# Patient Record
Sex: MALE | URBAN - METROPOLITAN AREA
[De-identification: names, ages, dates, MRNs, and addresses within clinical notes are randomized per-mention and may not be internally consistent; named-entity substitution may affect disease eponyms.]

---

## 2017-08-23 ENCOUNTER — EMERGENCY (EMERGENCY)
Facility: HOSPITAL | Age: 27
LOS: 1 days | Discharge: HOME | End: 2017-08-23
Attending: EMERGENCY MEDICINE | Admitting: EMERGENCY MEDICINE
Payer: SELF-PAY

## 2017-08-23 VITALS
HEART RATE: 87 BPM | SYSTOLIC BLOOD PRESSURE: 158 MMHG | TEMPERATURE: 99 F | DIASTOLIC BLOOD PRESSURE: 101 MMHG | RESPIRATION RATE: 18 BRPM | OXYGEN SATURATION: 100 %
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
## 2017-08-23 DIAGNOSIS — S92.352A DISPLACED FRACTURE OF FIFTH METATARSAL BONE, LEFT FOOT, INITIAL ENCOUNTER FOR CLOSED FRACTURE: ICD-10-CM
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
## 2017-08-23 DIAGNOSIS — Z88.8 ALLERGY STATUS TO OTHER DRUGS, MEDICAMENTS AND BIOLOGICAL SUBSTANCES: ICD-10-CM

## 2017-08-23 DIAGNOSIS — Z79.899 OTHER LONG TERM (CURRENT) DRUG THERAPY: ICD-10-CM
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
## 2017-08-23 DIAGNOSIS — M79.672 PAIN IN LEFT FOOT: ICD-10-CM

## 2017-08-23 PROCEDURE — 73630 X-RAY EXAM OF FOOT: CPT | Mod: 26,LT

## 2017-08-23 PROCEDURE — 99284 EMERGENCY DEPT VISIT MOD MDM: CPT | Mod: 25

## 2017-08-23 PROCEDURE — 28470 CLTX METATARSAL FX WO MNP EA: CPT | Mod: 54

## 2017-08-23 PROCEDURE — 99283 EMERGENCY DEPT VISIT LOW MDM: CPT | Mod: 25

## 2017-08-23 PROCEDURE — 28470 CLTX METATARSAL FX WO MNP EA: CPT | Mod: LT

## 2017-08-23 PROCEDURE — 73630 X-RAY EXAM OF FOOT: CPT

## 2017-08-23 RX ORDER — OXYCODONE AND ACETAMINOPHEN 5; 325 MG/1; MG/1
1 TABLET ORAL EVERY 4 HOURS
Qty: 0 | Refills: 0 | Status: DISCONTINUED | OUTPATIENT
Start: 2017-08-23 | End: 2017-08-23

## 2017-08-23 RX ORDER — ESOMEPRAZOLE MAGNESIUM 40 MG/1
0 CAPSULE, DELAYED RELEASE ORAL
Qty: 0 | Refills: 0 | COMMUNITY

## 2017-08-23 RX ADMIN — OXYCODONE AND ACETAMINOPHEN 1 TABLET(S): 5; 325 TABLET ORAL at 19:47

## 2017-08-23 RX ADMIN — OXYCODONE AND ACETAMINOPHEN 1 TABLET(S): 5; 325 TABLET ORAL at 19:07
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
## 2017-08-23 NOTE — ED PROVIDER NOTE - MEDICAL DECISION MAKING DETAILS
Pt stable. Pain controlled. X-ray show left closed non-displaced 5th Metatarsal neck fracture. Wrapped with webrol and ace bandage. Pt instructed to take Motrin for pain. Pt instructed to wear surgical shoe at all times. f/u in Podiatry clinic on [address] [place] [address]. Pt safe for d/c.
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
## 2017-08-23 NOTE — ED PROVIDER NOTE - CARE PLAN
Principal Discharge DX:	Fracture Principal Discharge DX:	Closed fracture of foot, left, initial encounter

## 2017-08-23 NOTE — ED PROVIDER NOTE - PHYSICAL EXAMINATION
Left foot focused exam:   Vasc: DP 2/4 PT 2/4. CFT < 3 sec x10. pedal hair present  Derm: ecchymosis on the dorsal aspect of the midfoot. no plantar ecchymosis. no open lesions. no abrasions. no clinical signs of infection.   Neuro: grossly intact  NSK: muscle power 4/5 in all compartments. Pain on eversion of the foot. Left foot focused exam:   Vasc: DP 2/4 PT 2/4. CFT < 3 sec x10. pedal hair present  Derm: ecchymosis on the dorsal aspect of the midfoot. no plantar ecchymosis. no open lesions. no abrasions. no clinical signs of infection.   Neuro: grossly intact  MSK: muscle power 4/5 in all compartments. Pain on eversion of the foot. ankle ROM wnl. STJ ROM wnl. Midtarsal joint ROM wnl.

## 2019-04-03 ENCOUNTER — OUTPATIENT (OUTPATIENT)
Dept: OUTPATIENT SERVICES | Facility: HOSPITAL | Age: 29
LOS: 1 days | Discharge: HOME | End: 2019-04-03
Payer: COMMERCIAL

## 2019-06-21 DIAGNOSIS — F32.2 MAJOR DEPRESSIVE DISORDER, SINGLE EPISODE, SEVERE WITHOUT PSYCHOTIC FEATURES: ICD-10-CM

## 2019-06-21 PROBLEM — Z00.00 ENCOUNTER FOR PREVENTIVE HEALTH EXAMINATION: Status: ACTIVE | Noted: 2019-06-21

## 2019-07-02 PROCEDURE — 90870 ELECTROCONVULSIVE THERAPY: CPT

## 2019-07-02 RX ORDER — MIDAZOLAM HYDROCHLORIDE 1 MG/ML
2 INJECTION, SOLUTION INTRAMUSCULAR; INTRAVENOUS ONCE
Refills: 0 | Status: DISCONTINUED | OUTPATIENT
Start: 2019-07-02 | End: 2019-07-02

## 2019-07-05 PROCEDURE — 90870 ELECTROCONVULSIVE THERAPY: CPT

## 2019-07-15 PROCEDURE — 90870 ELECTROCONVULSIVE THERAPY: CPT

## 2019-07-18 PROCEDURE — 90870 ELECTROCONVULSIVE THERAPY: CPT

## 2019-08-05 PROCEDURE — 90870 ELECTROCONVULSIVE THERAPY: CPT

## 2019-09-30 ENCOUNTER — TRANSCRIPTION ENCOUNTER (OUTPATIENT)
Age: 29
End: 2019-09-30

## 2020-12-15 ENCOUNTER — TRANSCRIPTION ENCOUNTER (OUTPATIENT)
Age: 30
End: 2020-12-15

## 2021-02-07 ENCOUNTER — TRANSCRIPTION ENCOUNTER (OUTPATIENT)
Age: 31
End: 2021-02-07

## 2021-02-12 ENCOUNTER — TRANSCRIPTION ENCOUNTER (OUTPATIENT)
Age: 31
End: 2021-02-12

## 2021-02-24 ENCOUNTER — TRANSCRIPTION ENCOUNTER (OUTPATIENT)
Age: 31
End: 2021-02-24

## 2021-07-06 NOTE — ED ADULT TRIAGE NOTE - BP NONINVASIVE SYSTOLIC (MM HG)
[Time Spent: ___ minutes] : I have spent [unfilled] minutes of time on the encounter. [>50% of the face to face encounter time was spent on counseling and/or coordination of care for ___] : Greater than 50% of the face to face encounter time was spent on counseling and/or coordination of care for [unfilled] 158

## 2021-07-26 ENCOUNTER — TRANSCRIPTION ENCOUNTER (OUTPATIENT)
Age: 31
End: 2021-07-26

## 2021-10-27 NOTE — ED PROVIDER NOTE - NS ED MD EM SELECTION
High Fiber Diet   What is Dietary Fiber?  All fiber comes from plants, bushes, evan or trees. Of course, the ones that we eat provide us with fruits, vegetables and grains. There are many different types of fiber but the most important to the health of the body are:  Insoluble Fiber  This fiber does not dissolve in water, nor is it fermented by the bacteria residing in the colon. Rather, it retains water and helps to promote a larger, bulkier and more regular bowel activity. This is important in preventing disorders such as diverticulosis and hemorrhoids, and in sweeping out certain toxins and cancer causing carcinogens.   Sources of insoluble fiber are:  • whole grain wheat and other whole grains  • corn bran, including popcorn, unflavored and unsweetened  • nuts and seeds  • potatoes and the skins from most fruits from trees such as apples, bananas and avocados  • many green vegetables such as green beans, zucchini, celery and cauliflower  • some fruit plants such as tomatoes and kiwi  Soluble Fiber  These fibers are fermented or used by the colon bacteria as a food source or nourishment. When these good bacteria grow and thrive, many health benefits occur in both the colon and the body. Soluble fiber is present in some degree in most edible plant foods, but the ones with the most soluble fiber include:  • legumes such as peas and most beans, including soybeans  • oats, rye and barley  • many fruits such as berries, plums, apples bananas and pears  • certain vegetables such as broccoli and carrots  • most root vegetables  • psyllium husk supplement products  Benefits of a High Fiber Diet  The health benefits of a high fiber diet, consumed on a regular basis and reaching recommended amounts (below), are now fairly well-defined. What is now known regarding a high fiber diet include:  Bowel Regularity  A high fiber diet promotes regularity with a softer, bulkier and regular stool pattern. This decreases the chance  of hemorrhoids, diverticulosis and perhaps colon cancer.  Cholesterol and Reduced Triglycerides  The soluble fibers are the ones that will reduce cholesterol levels when used on a regular basis. They may also reduce the incidence of coronary heart disease. Oats, flax seeds and legumes or beans are the recommended fibers.  Colon Polyps and Cancer  It is still not certain if a high fiber diet helps prevent colon cancer. Considerable research suggests that this may occur. Certainly it makes sense to increase regularity and so speed the movement of cancer causing carcinogens through the bowel. In addition, reducing a heavy meat diet reduces the bile flow from the liver in a favorable way. This, too, reduces the amount of carcinogens that reach and are manufactured in the colon. Finally, a high fiber diet, increases the integrity and health of the wall of the colon. The risk of cancer may be reduced.  Colon Wall Integrity  A high fiber diet changes the bacterial makeup of the colon toward a more favorable balance. For instance, it is known that those people with obesity, diabetes type 2 and inflammatory bowel disease have a predominance of bad bacteria in the colon. This, in turn, may render the bowel wall weak and allow bacteria and even toxins to seep through. A high fiber diet with a modest reduction in animal and meat products may return the bacterial makeup to a more positive balance.   Blood Sugar  Soluble fiber such as in legumes (beans), oats slows the absorption of blood sugar and so helps regulate the sugar in the blood. Insoluble fiber on a regular basis is associated with reduced risk of type 2 diabetes.  Weight Loss  High fiber diets are more filling and give a sense of fullness sooner than an animal and meat based diet does.   Bacteria and the Function of the Colon  The colon finishes the digestive process. The waste products move through in a nice regular manner. Insoluble fibers help this process by  retaining water and so producing a bulkier, softer stool, which is easy to pass. The additional role of the colon is to provide a home for an enormous number of micro-organisms, mostly bacteria. These bacteria play a major role in keeping the colon wall itself healthy. In addition, these good bacteria produce a very strong immune system for the body. They significantly increase calcium absorption and bone density.     How Much is Enough?  The amount of fiber in food is measured in grams. National nutritional authorities recommend the following amounts of dietary fiber daily.    Under Age 50  Over Age 50   Men 38 grams  30 grams   Women 25 grams  21 grams     Which Fibers and Which Foods are Best?  As noted, healthy fiber is only found in plants. The three major categories are whole grains, fruits and vegetables.  Whole Grains  Wheat, oats, barley, wild or brown rice, amaranth, buckwheat, bulgur, corn, millet, quinoa, rye, and sorghum. By far, wheat, oats and wild or brown rice are most common. Always buy whole grain products.   Fruits  Fruits come from trees such as apple and pear or from bushes or evan. You should eat a wide variety of fruits, preferably with every meal. In many cases, the skin of a fruit such as apple will contain much of the insoluble fiber while the pulp contains most of the soluble fiber. To the extent possible, buy organic fruits as these will have little or no pesticides. Always wash fruit.  Vegetables  Eat a wide variety of vegetables. They should be a mainstay of lunch and dinners. Frozen vegetables retain as much nutrition and fiber as fresh vegetables. As with fruit, try to buy organic to reduce any residual pesticide ingestion. Wash fresh vegetables thoroughly.  Legumes, Beans, Peas and Soybeans  These vegetables have plenty of soluble fiber and should be part of a varied vegetable intake. Beans, in particular, contain a certain type of fiber that may lead to harmless gas or  bloating.  Nuts and Seeds  These are rich sources of fiber and are a good substitute for sweets such as candies and baked sweet goods.   Fiber and Gas  Everyone has intestinal gas and that is a good thing. It means that bacteria are thriving. The normal amount of flatus passed each day depends on gender and what is eaten. The normal number of flatus is 10-20 times a day. When the bacteria that make intestinal gases are growing, it also means that other good bacteria are using the same fibers to grow and produce multiple health benefits. Soluble fiber should always be used in a gradual manner. If too much is consumed at any one time, then excess, but harmless, intestinal gas can occur. People with irritable bowel syndrome are particularly prone to bloating and mild cramping. In this instance, soluble fiber in the diet or supplement should be used in small doses and increased gradually.       [address]893 Exp Problem Focused - Mod. Complex

## 2021-12-10 ENCOUNTER — TRANSCRIPTION ENCOUNTER (OUTPATIENT)
Age: 31
End: 2021-12-10

## 2021-12-14 ENCOUNTER — APPOINTMENT (OUTPATIENT)
Dept: ORTHOPEDIC SURGERY | Facility: CLINIC | Age: 31
End: 2021-12-14
Payer: MEDICAID

## 2021-12-14 VITALS — WEIGHT: 182 LBS | HEIGHT: 71.5 IN | RESPIRATION RATE: 16 BRPM | BODY MASS INDEX: 24.92 KG/M2

## 2021-12-14 DIAGNOSIS — Z78.9 OTHER SPECIFIED HEALTH STATUS: ICD-10-CM

## 2021-12-14 DIAGNOSIS — Z82.0 FAMILY HISTORY OF EPILEPSY AND OTHER DISEASES OF THE NERVOUS SYSTEM: ICD-10-CM

## 2021-12-14 DIAGNOSIS — Z72.0 TOBACCO USE: ICD-10-CM

## 2021-12-14 DIAGNOSIS — Z81.8 FAMILY HISTORY OF OTHER MENTAL AND BEHAVIORAL DISORDERS: ICD-10-CM

## 2021-12-14 DIAGNOSIS — M54.12 RADICULOPATHY, CERVICAL REGION: ICD-10-CM

## 2021-12-14 DIAGNOSIS — M25.512 PAIN IN LEFT SHOULDER: ICD-10-CM

## 2021-12-14 DIAGNOSIS — F19.90 OTHER PSYCHOACTIVE SUBSTANCE USE, UNSPECIFIED, UNCOMPLICATED: ICD-10-CM

## 2021-12-14 DIAGNOSIS — Z82.61 FAMILY HISTORY OF ARTHRITIS: ICD-10-CM

## 2021-12-14 DIAGNOSIS — Z56.0 UNEMPLOYMENT, UNSPECIFIED: ICD-10-CM

## 2021-12-14 PROCEDURE — 73030 X-RAY EXAM OF SHOULDER: CPT | Mod: LT

## 2021-12-14 PROCEDURE — 99203 OFFICE O/P NEW LOW 30 MIN: CPT

## 2021-12-14 RX ORDER — QUETIAPINE 400 MG/1
TABLET, FILM COATED ORAL
Refills: 0 | Status: ACTIVE | COMMUNITY

## 2021-12-14 RX ORDER — HYDROGEN PEROXIDE 40 MG/100MG
40 SOLUTION TOPICAL
Refills: 0 | Status: ACTIVE | COMMUNITY

## 2021-12-14 RX ORDER — LEVOMILNACIPRAN HYDROCHLORIDE 40 MG/1
40 CAPSULE, EXTENDED RELEASE ORAL
Refills: 0 | Status: ACTIVE | COMMUNITY

## 2021-12-14 RX ORDER — LAMOTRIGINE 200 MG/1
200 TABLET ORAL
Refills: 0 | Status: ACTIVE | COMMUNITY

## 2021-12-14 SDOH — ECONOMIC STABILITY - INCOME SECURITY: UNEMPLOYMENT, UNSPECIFIED: Z56.0

## 2022-04-25 ENCOUNTER — TRANSCRIPTION ENCOUNTER (OUTPATIENT)
Age: 32
End: 2022-04-25